# Patient Record
Sex: MALE | Race: WHITE | NOT HISPANIC OR LATINO
[De-identification: names, ages, dates, MRNs, and addresses within clinical notes are randomized per-mention and may not be internally consistent; named-entity substitution may affect disease eponyms.]

---

## 2017-09-13 ENCOUNTER — RESULT REVIEW (OUTPATIENT)
Age: 67
End: 2017-09-13

## 2017-09-13 ENCOUNTER — OUTPATIENT (OUTPATIENT)
Dept: OUTPATIENT SERVICES | Facility: HOSPITAL | Age: 67
LOS: 1 days | Discharge: HOME | End: 2017-09-13

## 2017-09-13 DIAGNOSIS — Z98.89 OTHER SPECIFIED POSTPROCEDURAL STATES: Chronic | ICD-10-CM

## 2017-09-15 DIAGNOSIS — H04.551 ACQUIRED STENOSIS OF RIGHT NASOLACRIMAL DUCT: ICD-10-CM

## 2017-09-15 DIAGNOSIS — H04.411 CHRONIC DACRYOCYSTITIS OF RIGHT LACRIMAL PASSAGE: ICD-10-CM

## 2017-09-20 LAB — SURGICAL PATHOLOGY STUDY: SIGNIFICANT CHANGE UP

## 2017-09-21 DIAGNOSIS — M95.0 ACQUIRED DEFORMITY OF NOSE: ICD-10-CM

## 2017-09-21 DIAGNOSIS — Z85.01 PERSONAL HISTORY OF MALIGNANT NEOPLASM OF ESOPHAGUS: ICD-10-CM

## 2017-09-21 DIAGNOSIS — K21.9 GASTRO-ESOPHAGEAL REFLUX DISEASE WITHOUT ESOPHAGITIS: ICD-10-CM

## 2018-02-14 ENCOUNTER — RESULT REVIEW (OUTPATIENT)
Age: 68
End: 2018-02-14

## 2018-02-14 ENCOUNTER — OUTPATIENT (OUTPATIENT)
Dept: OUTPATIENT SERVICES | Facility: HOSPITAL | Age: 68
LOS: 1 days | Discharge: HOME | End: 2018-02-14

## 2018-02-14 DIAGNOSIS — Z98.89 OTHER SPECIFIED POSTPROCEDURAL STATES: Chronic | ICD-10-CM

## 2018-02-21 LAB — SURGICAL PATHOLOGY STUDY: SIGNIFICANT CHANGE UP

## 2019-04-22 ENCOUNTER — EMERGENCY (EMERGENCY)
Facility: HOSPITAL | Age: 69
LOS: 1 days | Discharge: HOME | End: 2019-04-22
Attending: EMERGENCY MEDICINE | Admitting: EMERGENCY MEDICINE
Payer: MEDICARE

## 2019-04-22 VITALS
HEART RATE: 54 BPM | SYSTOLIC BLOOD PRESSURE: 117 MMHG | TEMPERATURE: 98 F | RESPIRATION RATE: 18 BRPM | OXYGEN SATURATION: 100 % | DIASTOLIC BLOOD PRESSURE: 70 MMHG

## 2019-04-22 VITALS
HEART RATE: 65 BPM | RESPIRATION RATE: 16 BRPM | DIASTOLIC BLOOD PRESSURE: 64 MMHG | TEMPERATURE: 98 F | SYSTOLIC BLOOD PRESSURE: 113 MMHG | OXYGEN SATURATION: 98 %

## 2019-04-22 DIAGNOSIS — M25.552 PAIN IN LEFT HIP: ICD-10-CM

## 2019-04-22 DIAGNOSIS — Z98.89 OTHER SPECIFIED POSTPROCEDURAL STATES: Chronic | ICD-10-CM

## 2019-04-22 DIAGNOSIS — Z79.899 OTHER LONG TERM (CURRENT) DRUG THERAPY: ICD-10-CM

## 2019-04-22 DIAGNOSIS — R10.32 LEFT LOWER QUADRANT PAIN: ICD-10-CM

## 2019-04-22 LAB
ALBUMIN SERPL ELPH-MCNC: 4.3 G/DL — SIGNIFICANT CHANGE UP (ref 3.3–5)
ALP SERPL-CCNC: 43 U/L — SIGNIFICANT CHANGE UP (ref 40–120)
ALT FLD-CCNC: 19 U/L — SIGNIFICANT CHANGE UP (ref 10–45)
ANION GAP SERPL CALC-SCNC: 11 MMOL/L — SIGNIFICANT CHANGE UP (ref 5–17)
APPEARANCE UR: CLEAR — SIGNIFICANT CHANGE UP
APTT BLD: 29 SEC — SIGNIFICANT CHANGE UP (ref 27.5–36.3)
AST SERPL-CCNC: 21 U/L — SIGNIFICANT CHANGE UP (ref 10–40)
BASOPHILS # BLD AUTO: 0.02 K/UL — SIGNIFICANT CHANGE UP (ref 0–0.2)
BASOPHILS NFR BLD AUTO: 0.5 % — SIGNIFICANT CHANGE UP (ref 0–2)
BILIRUB SERPL-MCNC: 1.5 MG/DL — HIGH (ref 0.2–1.2)
BILIRUB UR-MCNC: NEGATIVE — SIGNIFICANT CHANGE UP
BLD GP AB SCN SERPL QL: NEGATIVE — SIGNIFICANT CHANGE UP
BUN SERPL-MCNC: 22 MG/DL — SIGNIFICANT CHANGE UP (ref 7–23)
CALCIUM SERPL-MCNC: 10.3 MG/DL — SIGNIFICANT CHANGE UP (ref 8.4–10.5)
CHLORIDE SERPL-SCNC: 106 MMOL/L — SIGNIFICANT CHANGE UP (ref 96–108)
CO2 SERPL-SCNC: 25 MMOL/L — SIGNIFICANT CHANGE UP (ref 22–31)
COLOR SPEC: YELLOW — SIGNIFICANT CHANGE UP
CREAT SERPL-MCNC: 0.96 MG/DL — SIGNIFICANT CHANGE UP (ref 0.5–1.3)
DIFF PNL FLD: ABNORMAL
EOSINOPHIL # BLD AUTO: 0.12 K/UL — SIGNIFICANT CHANGE UP (ref 0–0.5)
EOSINOPHIL NFR BLD AUTO: 3.1 % — SIGNIFICANT CHANGE UP (ref 0–6)
GLUCOSE SERPL-MCNC: 88 MG/DL — SIGNIFICANT CHANGE UP (ref 70–99)
GLUCOSE UR QL: NEGATIVE — SIGNIFICANT CHANGE UP
HCT VFR BLD CALC: 38.3 % — LOW (ref 39–50)
HGB BLD-MCNC: 12.8 G/DL — LOW (ref 13–17)
IMM GRANULOCYTES NFR BLD AUTO: 0 % — SIGNIFICANT CHANGE UP (ref 0–1.5)
INR BLD: 0.96 — SIGNIFICANT CHANGE UP (ref 0.88–1.16)
KETONES UR-MCNC: NEGATIVE — SIGNIFICANT CHANGE UP
LEUKOCYTE ESTERASE UR-ACNC: NEGATIVE — SIGNIFICANT CHANGE UP
LYMPHOCYTES # BLD AUTO: 0.42 K/UL — LOW (ref 1–3.3)
LYMPHOCYTES # BLD AUTO: 10.7 % — LOW (ref 13–44)
MCHC RBC-ENTMCNC: 32.7 PG — SIGNIFICANT CHANGE UP (ref 27–34)
MCHC RBC-ENTMCNC: 33.4 GM/DL — SIGNIFICANT CHANGE UP (ref 32–36)
MCV RBC AUTO: 97.7 FL — SIGNIFICANT CHANGE UP (ref 80–100)
MONOCYTES # BLD AUTO: 0.35 K/UL — SIGNIFICANT CHANGE UP (ref 0–0.9)
MONOCYTES NFR BLD AUTO: 9 % — SIGNIFICANT CHANGE UP (ref 2–14)
NEUTROPHILS # BLD AUTO: 3 K/UL — SIGNIFICANT CHANGE UP (ref 1.8–7.4)
NEUTROPHILS NFR BLD AUTO: 76.7 % — SIGNIFICANT CHANGE UP (ref 43–77)
NITRITE UR-MCNC: NEGATIVE — SIGNIFICANT CHANGE UP
NRBC # BLD: 0 /100 WBCS — SIGNIFICANT CHANGE UP (ref 0–0)
PH UR: 5 — SIGNIFICANT CHANGE UP (ref 5–8)
PLATELET # BLD AUTO: 180 K/UL — SIGNIFICANT CHANGE UP (ref 150–400)
POTASSIUM SERPL-MCNC: 4.3 MMOL/L — SIGNIFICANT CHANGE UP (ref 3.5–5.3)
POTASSIUM SERPL-SCNC: 4.3 MMOL/L — SIGNIFICANT CHANGE UP (ref 3.5–5.3)
PROT SERPL-MCNC: 7 G/DL — SIGNIFICANT CHANGE UP (ref 6–8.3)
PROT UR-MCNC: NEGATIVE MG/DL — SIGNIFICANT CHANGE UP
PROTHROM AB SERPL-ACNC: 10.8 SEC — SIGNIFICANT CHANGE UP (ref 10–12.9)
RBC # BLD: 3.92 M/UL — LOW (ref 4.2–5.8)
RBC # FLD: 12.1 % — SIGNIFICANT CHANGE UP (ref 10.3–14.5)
RH IG SCN BLD-IMP: NEGATIVE — SIGNIFICANT CHANGE UP
SODIUM SERPL-SCNC: 142 MMOL/L — SIGNIFICANT CHANGE UP (ref 135–145)
SP GR SPEC: <=1.005 — SIGNIFICANT CHANGE UP (ref 1–1.03)
UROBILINOGEN FLD QL: 0.2 E.U./DL — SIGNIFICANT CHANGE UP
WBC # BLD: 3.91 K/UL — SIGNIFICANT CHANGE UP (ref 3.8–10.5)
WBC # FLD AUTO: 3.91 K/UL — SIGNIFICANT CHANGE UP (ref 3.8–10.5)

## 2019-04-22 PROCEDURE — 86901 BLOOD TYPING SEROLOGIC RH(D): CPT

## 2019-04-22 PROCEDURE — 74177 CT ABD & PELVIS W/CONTRAST: CPT

## 2019-04-22 PROCEDURE — 99284 EMERGENCY DEPT VISIT MOD MDM: CPT

## 2019-04-22 PROCEDURE — 85610 PROTHROMBIN TIME: CPT

## 2019-04-22 PROCEDURE — 86900 BLOOD TYPING SEROLOGIC ABO: CPT

## 2019-04-22 PROCEDURE — 86850 RBC ANTIBODY SCREEN: CPT

## 2019-04-22 PROCEDURE — 81001 URINALYSIS AUTO W/SCOPE: CPT

## 2019-04-22 PROCEDURE — 80053 COMPREHEN METABOLIC PANEL: CPT

## 2019-04-22 PROCEDURE — 74177 CT ABD & PELVIS W/CONTRAST: CPT | Mod: 26

## 2019-04-22 PROCEDURE — 36415 COLL VENOUS BLD VENIPUNCTURE: CPT

## 2019-04-22 PROCEDURE — 85730 THROMBOPLASTIN TIME PARTIAL: CPT

## 2019-04-22 PROCEDURE — 99284 EMERGENCY DEPT VISIT MOD MDM: CPT | Mod: 25

## 2019-04-22 PROCEDURE — 85025 COMPLETE CBC W/AUTO DIFF WBC: CPT

## 2019-04-22 RX ORDER — IOHEXOL 300 MG/ML
30 INJECTION, SOLUTION INTRAVENOUS ONCE
Qty: 0 | Refills: 0 | Status: COMPLETED | OUTPATIENT
Start: 2019-04-22 | End: 2019-04-22

## 2019-04-22 RX ADMIN — IOHEXOL 30 MILLILITER(S): 300 INJECTION, SOLUTION INTRAVENOUS at 14:57

## 2019-04-22 NOTE — ED ADULT NURSE NOTE - OBJECTIVE STATEMENT
Patient is a 67yo male reporting left groin pain x10 days. Patient reports he thought he had pulled a muscle but the pain has been increasing. Denies fevers, chills, abdominal pain, n/v/d, urinary symptoms. Ambulating with steady gait.

## 2019-04-22 NOTE — ED PROVIDER NOTE - CARE PROVIDER_API CALL
Mikey Shelby)  Surgery  [address] [place] [address], [address]  [place], [place] [address]  Phone: [phone]  Fax: [phone]  Follow Up Time:

## 2019-04-22 NOTE — ED ADULT TRIAGE NOTE - CHIEF COMPLAINT QUOTE
pt c/o L groin pain for 10 days. referred by Dr. Kovacs to r/o hernia. denies fever/chills, chest pain, sob, swelling or redness.

## 2019-04-22 NOTE — CONSULT NOTE ADULT - SUBJECTIVE AND OBJECTIVE BOX
GENERAL SURGERY CONSULT NOTE    HPI:    Mr. Rowe is a 67 yo M with history of head/neck cancer s/p radiation and chemotherapy (cancer in remission per follow-up in 1/2019) and esophageal cancer s/p esophagectomy (18 years ago) presenting with left groin pain and discomfort. Patient reports that the he first noticed his left groin pain 7 days ago. He has never had anything similar in the past. He states that the achy pain radiates to his left buttock. He denies any exacerbating or alleviating factors. He states that he is very active and exercises including biking for at least 1 hour every day. He reports that he is unable to reproduce the pain and does not believe it is related to his exercising. He denies fevers, chills, nausea, or emesis. His last bowel movement was today and he denies hematochezia or melena.    CT imaging does not show any acute pathology.     PMH/PSx: head/neck cancer s/p radiation and chemotherapy (cancer in remission per follow-up in 1/2019) and esophageal cancer s/p esophagectomy (18 years ago), osteoporosis (on DEXA scan 1 year ago, not on medications at this time)  Allergies: NKDA  Medications: ranitidine, chlorthalidone  FH: father diagnosed with colon cancer at age 80s, mother diagnosed with ovarian cancer at age 40s  SH: social ETOH use, denies smoking or drug use      Vital Signs Last 24 Hrs  T(C): 36.4 (22 Apr 2019 13:48), Max: 36.4 (22 Apr 2019 13:48)  T(F): 97.6 (22 Apr 2019 13:48), Max: 97.6 (22 Apr 2019 13:48)  HR: 54 (22 Apr 2019 13:48) (54 - 54)  BP: 117/70 (22 Apr 2019 13:48) (117/70 - 117/70)  BP(mean): --  RR: 18 (22 Apr 2019 13:48) (18 - 18)  SpO2: 100% (22 Apr 2019 13:48) (100% - 100%)    PHYSICAL EXAM:  General: no acute distress  Cardiovascular: NSR  Pulmonary: nonlabored breathing, no respiratory distress  Abdomen: soft, nondistended, nontender  Groin: b/l small inguinal hernias on Valsalva. Nontender.  Extremities: nonedematous    LABS:                        12.8   3.91  )-----------( 180      ( 22 Apr 2019 14:57 )             38.3     04-22    142  |  106  |  22  ----------------------------<  88  4.3   |  25  |  0.96    Ca    10.3      22 Apr 2019 14:57    TPro  7.0  /  Alb  4.3  /  TBili  1.5<H>  /  DBili  x   /  AST  21  /  ALT  19  /  AlkPhos  43  04-22    PT/INR - ( 22 Apr 2019 14:57 )   PT: 10.8 sec;   INR: 0.96          PTT - ( 22 Apr 2019 14:57 )  PTT:29.0 sec  Urinalysis Basic - ( 22 Apr 2019 19:10 )    Color: Yellow / Appearance: Clear / SG: <=1.005 / pH: x  Gluc: x / Ketone: NEGATIVE  / Bili: Negative / Urobili: 0.2 E.U./dL   Blood: x / Protein: NEGATIVE mg/dL / Nitrite: NEGATIVE   Leuk Esterase: NEGATIVE / RBC: < 5 /HPF / WBC < 5 /HPF   Sq Epi: x / Non Sq Epi: 0-5 /HPF / Bacteria: Present /HPF

## 2019-04-22 NOTE — ED PROVIDER NOTE - NS ED ROS FT
Constitutional: No fever. No chills.  Eyes: No redness. No discharge. No vision change.   ENT: No sore throat. No ear pain.  Cardiovascular: No chest pain. No leg swelling.  Respiratory: No cough. No shortness of breath.  GI: No abdominal pain. No vomiting. No diarrhea.   : +L groin pain  MSK: No joint pain. No back pain.   Skin: No rash. No abrasions.   Neuro: No numbness. No weakness.   Psych: No known mental health issues.

## 2019-04-22 NOTE — ED PROVIDER NOTE - ATTENDING CONTRIBUTION TO CARE
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  Recently increased exercise.  Positional.  Sent for r/o hernia.  Hx of esophageal and tonsillar cancer.  Mild ttp L groin no clear hernia.  CT and labs obtained and noted.  Shared results with patient.  Surgery cleared for discharge.  LIkely msk but will need follow up for incidental results.

## 2019-04-22 NOTE — ED PROVIDER NOTE - PHYSICAL EXAMINATION
VITAL SIGNS: I have reviewed nursing notes and confirm.  CONSTITUTIONAL: Well-developed; well-nourished; in no acute distress.   SKIN:  warm and dry, no acute rash.   HEAD:  normocephalic, atraumatic.  EYES: PERRL, EOM intact; conjunctiva and sclera clear.  ENT: No nasal discharge; airway clear.   NECK: Supple; non tender.  CARD: S1, S2 normal; no murmurs, gallops, or rubs. Regular rate and rhythm.   RESP:  Clear to auscultation b/l, no wheezes, rales or rhonchi.  ABD: Normal bowel sounds; soft; non-distended; non-tender; no guarding/ rebound. No palpable hernia.   EXT: Normal ROM. Nttp of left lateral hip. No clubbing, cyanosis or edema. 2+ pulses to b/l ue/le.  NEURO: Alert, oriented, grossly unremarkable. 5/5 strength in BLE. Sensation equal and intact in BLE.   PSYCH: Cooperative, mood and affect appropriate.

## 2019-04-22 NOTE — CONSULT NOTE ADULT - ASSESSMENT
Mr. Rowe is a 67 yo M presenting with left groin pain radiating to left buttock likely secondary to musculoskeletal origin.    - Patient does not need any surgical intervention given negative inguinal hernia finding on imaging.   - Patient is instructed to follow up in office with Dr. Shelby on Friday 4/26.  - Patient instructed to return to ED if worsening symptoms or any other concerns.  - Plan discussed with attending and chief resident.

## 2019-04-22 NOTE — ED ADULT NURSE NOTE - NSIMPLEMENTINTERV_GEN_ALL_ED
Implemented All Universal Safety Interventions:  [place] to call system. Call bell, personal items and telephone within reach. Instruct patient to call for assistance. Room bathroom lighting operational. Non-slip footwear when patient is off stretcher. Physically safe environment: no spills, clutter or unnecessary equipment. Stretcher in lowest position, wheels locked, appropriate side rails in place.

## 2019-04-22 NOTE — ED PROVIDER NOTE - CLINICAL SUMMARY MEDICAL DECISION MAKING FREE TEXT BOX
ED course unremarkable - afebrile and hemodynamically stable. Pain non-reproducible on exam. Declined pain medication while in ED. CBC, CMP unremarkable. UA without evidence of infection. CTAP without acute findings, no etiology for pt's pain. No evidence of hernia or occult hip fracture. Surgery evaluated pt in ED, discussed results with patient. Pt will follow up with Dr. Kovacs on 4/26. Strict return precautions given.

## 2019-04-22 NOTE — ED PROVIDER NOTE - NSFOLLOWUPINSTRUCTIONS_ED_ALL_ED_FT
Follow up with Dr. Shelby as scheduled on 4/26/19.    Return to the Emergency Department if you develop fever>100.4F, worsening pain, numbness or weakness of leg, or any other concerns.

## 2019-04-22 NOTE — ED PROVIDER NOTE - OBJECTIVE STATEMENT
67yo male with pmhx of esophageal cancer (15 years ago) and tonsillar cancer (now s/p chemo in remission) presents with L groin/hip pain x10 days. Pt reports gradual onset of pain without preceding injury or trauma. Denies fever, chills, abd pain, N/V/D, constipation, urinary symptoms, numbness or weakness of lower extremities. No pain with bearing weight. Pt is a long distance cyclist and went on 6 day bike trip in [place] prior to onset. Sent by Dr. Kovacs for r/o inguinal hernia vs. occult hip fracture.

## 2022-10-10 PROBLEM — C09.9 MALIGNANT NEOPLASM OF TONSIL, UNSPECIFIED: Chronic | Status: ACTIVE | Noted: 2019-04-22

## 2022-11-08 ENCOUNTER — NON-APPOINTMENT (OUTPATIENT)
Age: 72
End: 2022-11-08

## 2022-11-08 ENCOUNTER — APPOINTMENT (OUTPATIENT)
Dept: HEART AND VASCULAR | Facility: CLINIC | Age: 72
End: 2022-11-08

## 2022-11-08 VITALS
BODY MASS INDEX: 21.53 KG/M2 | HEIGHT: 66 IN | HEART RATE: 58 BPM | WEIGHT: 134 LBS | SYSTOLIC BLOOD PRESSURE: 112 MMHG | DIASTOLIC BLOOD PRESSURE: 70 MMHG

## 2022-11-08 DIAGNOSIS — Z87.19 PERSONAL HISTORY OF OTHER DISEASES OF THE DIGESTIVE SYSTEM: ICD-10-CM

## 2022-11-08 DIAGNOSIS — Z85.818 PERSONAL HISTORY OF MALIGNANT NEOPLASM OF OTHER SITES OF LIP, ORAL CAVITY, AND PHARYNX: ICD-10-CM

## 2022-11-08 DIAGNOSIS — Z85.01 PERSONAL HISTORY OF MALIGNANT NEOPLASM OF ESOPHAGUS: ICD-10-CM

## 2022-11-08 DIAGNOSIS — Z78.9 OTHER SPECIFIED HEALTH STATUS: ICD-10-CM

## 2022-11-08 DIAGNOSIS — Z86.19 PERSONAL HISTORY OF OTHER INFECTIOUS AND PARASITIC DISEASES: ICD-10-CM

## 2022-11-08 DIAGNOSIS — I34.0 NONRHEUMATIC MITRAL (VALVE) INSUFFICIENCY: ICD-10-CM

## 2022-11-08 DIAGNOSIS — Z82.49 FAMILY HISTORY OF ISCHEMIC HEART DISEASE AND OTHER DISEASES OF THE CIRCULATORY SYSTEM: ICD-10-CM

## 2022-11-08 DIAGNOSIS — E78.5 HYPERLIPIDEMIA, UNSPECIFIED: ICD-10-CM

## 2022-11-08 DIAGNOSIS — Z80.41 FAMILY HISTORY OF MALIGNANT NEOPLASM OF OVARY: ICD-10-CM

## 2022-11-08 DIAGNOSIS — Z87.891 PERSONAL HISTORY OF NICOTINE DEPENDENCE: ICD-10-CM

## 2022-11-08 PROCEDURE — 93000 ELECTROCARDIOGRAM COMPLETE: CPT

## 2022-11-08 PROCEDURE — 99204 OFFICE O/P NEW MOD 45 MIN: CPT | Mod: 25

## 2022-11-08 PROCEDURE — 36415 COLL VENOUS BLD VENIPUNCTURE: CPT

## 2022-11-08 RX ORDER — CLONAZEPAM 0.5 MG/1
0.5 TABLET ORAL
Qty: 90 | Refills: 0 | Status: ACTIVE | COMMUNITY
Start: 2022-10-27

## 2022-11-08 RX ORDER — LISDEXAMFETAMINE DIMESYLATE 10 MG/1
10 CAPSULE ORAL
Qty: 60 | Refills: 0 | Status: ACTIVE | COMMUNITY
Start: 2022-10-11

## 2022-11-08 RX ORDER — SODIUM FLUORIDE 1.1 G/100G
1.1 GEL, DENTIFRICE ORAL
Qty: 51 | Refills: 0 | Status: ACTIVE | COMMUNITY
Start: 2022-05-06

## 2022-11-08 RX ORDER — FAMOTIDINE 10 MG/1
TABLET, FILM COATED ORAL
Refills: 0 | Status: ACTIVE | COMMUNITY

## 2022-11-09 NOTE — CARDIOLOGY SUMMARY
[de-identified] : 07/31/2018: moderate mitral regurgitation, prolapse of anterior mitral leaflet, normal LVSF \par 09/11/2017: mild mitral regurgitation, normal LVSF

## 2022-11-09 NOTE — HISTORY OF PRESENT ILLNESS
[FreeTextEntry1] : Since his last visit, patient is feeling well overall. He remains active riding his bicycle 3-7 days/week, between  miles per week. He also does strength training with a  2 times/week and roller skates several days, up to 3 hours at a time. No exertional chest pain or shortness of breath. He also denies any orthopnea, PND, palpitations, lightheadedness, syncope or lower extremity edema. \par \par Of note: \par - [place] [facility] [place] \par - He is up-to-date with his colonoscopy \par - PCP is Dr. Bowles \par  few months \par ---------------------------\par Labs (10/2022);\par Lipid profile: TC: 202, T, HDL: 80, LDL: 111

## 2022-11-09 NOTE — ASSESSMENT
[FreeTextEntry1] : Pt. is a 71 year old M with PMHx of HLD, esophageal cancer (s/p esophagectomy), tonsillar cancer and brain mass (s/p chemo and radiation) who presents today for follow-up after several years. \par \par Mitral regurgitation: \par - EKG today: Sinus bradycardia \par - BP well-controlled\par - Last echo (2019) revealed moderate mitral regurgitation with normal LVSF \par - Will repeat echo at follow-up\par \par HLD/ASCVD risk reduction:\par - ASCVD risk elevated at 10.97%\par - Last LDL (10/2022) 111 elevated above goal LDL < 100 \par - Coronary artery calcium (CAC) ordered for further risk stratification. Based on all test results, we will decide on an appropriate preventive treatment regimen for the pt.\par - Encouraged patient to continue healthy exercise and eating habits, focusing on a Mediterranean style of eating and aiming for the recommended 150 minutes per week of moderate physical activity \par \par Pt. to RTC for echocardiogram and stress EKG\par I have supervised  this with the office acp.I have preformed a history,physical exam and my own medical decision making. I have reviewed the note and agree with the findings and have reviewed a plan

## 2022-11-09 NOTE — REVIEW OF SYSTEMS
[Fever] : no fever [Chills] : no chills [SOB] : no shortness of breath [Dyspnea on exertion] : not dyspnea during exertion [Chest Discomfort] : no chest discomfort [Lower Ext Edema] : no extremity edema [Leg Claudication] : no intermittent leg claudication [Palpitations] : no palpitations [Orthopnea] : no orthopnea [PND] : no PND [Syncope] : no syncope [Dizziness] : no dizziness

## 2022-11-09 NOTE — REASON FOR VISIT
[Hyperlipidemia] : hyperlipidemia [FreeTextEntry1] : Pt. is a 71 year old M with PMHx of HLD, esophageal cancer (s/p esophagectomy), tonsillar cancer and brain mass (s/p chemo and radiation) who presents today for follow-up after several years.

## 2022-11-09 NOTE — PHYSICAL EXAM
[Normal Conjunctiva] : normal conjunctiva [Normal Venous Pressure] : normal venous pressure [No Carotid Bruit] : no carotid bruit [Normal S1, S2] : normal S1, S2 [Normal Gait] : normal gait [No Edema] : no edema [Normal] : alert and oriented, normal memory [de-identified] : 1/6 systolic murmur

## 2022-12-29 ENCOUNTER — OUTPATIENT (OUTPATIENT)
Dept: OUTPATIENT SERVICES | Facility: HOSPITAL | Age: 72
LOS: 1 days | End: 2022-12-29
Payer: SELF-PAY

## 2022-12-29 ENCOUNTER — APPOINTMENT (OUTPATIENT)
Dept: CT IMAGING | Facility: HOSPITAL | Age: 72
End: 2022-12-29

## 2022-12-29 DIAGNOSIS — Z98.89 OTHER SPECIFIED POSTPROCEDURAL STATES: Chronic | ICD-10-CM

## 2022-12-29 PROCEDURE — 75571 CT HRT W/O DYE W/CA TEST: CPT | Mod: 26

## 2022-12-29 PROCEDURE — 75571 CT HRT W/O DYE W/CA TEST: CPT

## 2023-01-02 ENCOUNTER — NON-APPOINTMENT (OUTPATIENT)
Age: 73
End: 2023-01-02

## 2023-01-03 ENCOUNTER — NON-APPOINTMENT (OUTPATIENT)
Age: 73
End: 2023-01-03

## 2023-01-04 ENCOUNTER — APPOINTMENT (OUTPATIENT)
Dept: HEART AND VASCULAR | Facility: CLINIC | Age: 73
End: 2023-01-04

## 2023-02-23 ENCOUNTER — APPOINTMENT (OUTPATIENT)
Dept: HEART AND VASCULAR | Facility: CLINIC | Age: 73
End: 2023-02-23
Payer: MEDICARE

## 2023-02-23 VITALS
BODY MASS INDEX: 21.69 KG/M2 | HEIGHT: 66 IN | WEIGHT: 135 LBS | DIASTOLIC BLOOD PRESSURE: 60 MMHG | SYSTOLIC BLOOD PRESSURE: 96 MMHG

## 2023-02-23 PROCEDURE — 93015 CV STRESS TEST SUPVJ I&R: CPT

## 2023-02-23 PROCEDURE — 93306 TTE W/DOPPLER COMPLETE: CPT

## 2023-02-23 PROCEDURE — 99214 OFFICE O/P EST MOD 30 MIN: CPT | Mod: 25

## 2023-02-24 NOTE — HISTORY OF PRESENT ILLNESS
[FreeTextEntry1] : Since his last visit, patient is feeling well overall. He remains active riding his bicycle 3-7 days/week, between  miles per week. He also does strength training with a  2 times/week and roller skates several days, up to 3 hours at a time. No exertional chest pain or shortness of breath. He also denies any orthopnea, PND, palpitations, lightheadedness, syncope or lower extremity edema. \par \par Of note: \par - He follows up with his Oncologist, Dr. Clayton Beyer at [facility] [place] every few months \par - He is up-to-date with his colonoscopy \par - PCP is Dr. Bowles \par  few months \par ---------------------------\par Labs (10/2022);\par Lipid profile: TC: 202, T, HDL: 80, LDL: 111

## 2023-02-24 NOTE — CARDIOLOGY SUMMARY
[de-identified] : 07/31/2018: moderate mitral regurgitation, prolapse of anterior mitral leaflet, normal LVSF \par 09/11/2017: mild mitral regurgitation, normal LVSF

## 2023-02-24 NOTE — ASSESSMENT
[FreeTextEntry1] : Pt. is a 71 year old M with PMHx of HLD, esophageal cancer (s/p esophagectomy), tonsillar cancer and brain mass (s/p chemo and radiation) who presents today for follow-up after several years. \par \par Mitral regurgitation: \par - EKG today: Sinus bradycardia \par - BP well-controlled\par - echo (2019) revealed moderate mitral regurgitation with normal LVSF \par - echocardiogram mild mr\par \par HLD/ASCVD risk reduction:\par - ASCVD risk elevated at 10.97%\par - Last LDL (10/2022) 111 elevated above goal LDL < 100 \par - Coronary artery calcium (CAC)  of 0 but pt had significant st depressions onett sent for cta\par - Encouraged patient to continue healthy exercise and eating habits, focusing on a Mediterranean style of eating and aiming for the recommended 150 minutes per week of moderate physical activity \par \par \par

## 2023-02-24 NOTE — PHYSICAL EXAM
[Normal Conjunctiva] : normal conjunctiva [Normal Venous Pressure] : normal venous pressure [No Carotid Bruit] : no carotid bruit [Normal S1, S2] : normal S1, S2 [Normal Gait] : normal gait [No Edema] : no edema [Normal] : alert and oriented, normal memory [de-identified] : 1/6 systolic murmur

## 2023-02-28 ENCOUNTER — APPOINTMENT (OUTPATIENT)
Dept: CT IMAGING | Facility: HOSPITAL | Age: 73
End: 2023-02-28

## 2023-02-28 ENCOUNTER — OUTPATIENT (OUTPATIENT)
Dept: OUTPATIENT SERVICES | Facility: HOSPITAL | Age: 73
LOS: 1 days | End: 2023-02-28
Payer: MEDICARE

## 2023-02-28 DIAGNOSIS — Z98.89 OTHER SPECIFIED POSTPROCEDURAL STATES: Chronic | ICD-10-CM

## 2023-02-28 LAB — POCT ISTAT CREATININE: 1.1 MG/DL — SIGNIFICANT CHANGE UP (ref 0.5–1.3)

## 2023-02-28 PROCEDURE — 75574 CT ANGIO HRT W/3D IMAGE: CPT | Mod: 26,MH

## 2023-02-28 PROCEDURE — 75574 CT ANGIO HRT W/3D IMAGE: CPT

## 2023-02-28 PROCEDURE — 82565 ASSAY OF CREATININE: CPT

## 2024-11-10 ENCOUNTER — EMERGENCY (EMERGENCY)
Facility: HOSPITAL | Age: 74
LOS: 1 days | Discharge: HOME | End: 2024-11-10
Attending: EMERGENCY MEDICINE | Admitting: EMERGENCY MEDICINE
Payer: MEDICARE

## 2024-11-10 VITALS
RESPIRATION RATE: 18 BRPM | WEIGHT: 138.01 LBS | DIASTOLIC BLOOD PRESSURE: 79 MMHG | HEART RATE: 78 BPM | SYSTOLIC BLOOD PRESSURE: 122 MMHG | TEMPERATURE: 99 F | OXYGEN SATURATION: 97 %

## 2024-11-10 VITALS
RESPIRATION RATE: 18 BRPM | DIASTOLIC BLOOD PRESSURE: 71 MMHG | OXYGEN SATURATION: 97 % | TEMPERATURE: 98 F | HEART RATE: 62 BPM | SYSTOLIC BLOOD PRESSURE: 114 MMHG

## 2024-11-10 DIAGNOSIS — Z98.89 OTHER SPECIFIED POSTPROCEDURAL STATES: Chronic | ICD-10-CM

## 2024-11-10 DIAGNOSIS — Z85.01 PERSONAL HISTORY OF MALIGNANT NEOPLASM OF ESOPHAGUS: ICD-10-CM

## 2024-11-10 LAB
ALBUMIN SERPL ELPH-MCNC: 4.2 G/DL — SIGNIFICANT CHANGE UP (ref 3.3–5)
ALP SERPL-CCNC: 58 U/L — SIGNIFICANT CHANGE UP (ref 40–120)
ALT FLD-CCNC: 13 U/L — SIGNIFICANT CHANGE UP (ref 10–45)
ANION GAP SERPL CALC-SCNC: 11 MMOL/L — SIGNIFICANT CHANGE UP (ref 5–17)
AST SERPL-CCNC: 16 U/L — SIGNIFICANT CHANGE UP (ref 10–40)
BASOPHILS # BLD AUTO: 0.03 K/UL — SIGNIFICANT CHANGE UP (ref 0–0.2)
BASOPHILS NFR BLD AUTO: 0.4 % — SIGNIFICANT CHANGE UP (ref 0–2)
BILIRUB SERPL-MCNC: 1.5 MG/DL — HIGH (ref 0.2–1.2)
BUN SERPL-MCNC: 17 MG/DL — SIGNIFICANT CHANGE UP (ref 7–23)
CALCIUM SERPL-MCNC: 9.3 MG/DL — SIGNIFICANT CHANGE UP (ref 8.4–10.5)
CHLORIDE SERPL-SCNC: 103 MMOL/L — SIGNIFICANT CHANGE UP (ref 96–108)
CO2 SERPL-SCNC: 24 MMOL/L — SIGNIFICANT CHANGE UP (ref 22–31)
CREAT SERPL-MCNC: 1 MG/DL — SIGNIFICANT CHANGE UP (ref 0.5–1.3)
EGFR: 80 ML/MIN/1.73M2 — SIGNIFICANT CHANGE UP
EOSINOPHIL # BLD AUTO: 0.15 K/UL — SIGNIFICANT CHANGE UP (ref 0–0.5)
EOSINOPHIL NFR BLD AUTO: 1.9 % — SIGNIFICANT CHANGE UP (ref 0–6)
GLUCOSE SERPL-MCNC: 109 MG/DL — HIGH (ref 70–99)
HCT VFR BLD CALC: 39 % — SIGNIFICANT CHANGE UP (ref 39–50)
HGB BLD-MCNC: 13.3 G/DL — SIGNIFICANT CHANGE UP (ref 13–17)
IMM GRANULOCYTES NFR BLD AUTO: 0.3 % — SIGNIFICANT CHANGE UP (ref 0–0.9)
LIDOCAIN IGE QN: 52 U/L — SIGNIFICANT CHANGE UP (ref 7–60)
LYMPHOCYTES # BLD AUTO: 0.61 K/UL — LOW (ref 1–3.3)
LYMPHOCYTES # BLD AUTO: 7.8 % — LOW (ref 13–44)
MCHC RBC-ENTMCNC: 31.1 PG — SIGNIFICANT CHANGE UP (ref 27–34)
MCHC RBC-ENTMCNC: 34.1 G/DL — SIGNIFICANT CHANGE UP (ref 32–36)
MCV RBC AUTO: 91.1 FL — SIGNIFICANT CHANGE UP (ref 80–100)
MONOCYTES # BLD AUTO: 0.68 K/UL — SIGNIFICANT CHANGE UP (ref 0–0.9)
MONOCYTES NFR BLD AUTO: 8.7 % — SIGNIFICANT CHANGE UP (ref 2–14)
NEUTROPHILS # BLD AUTO: 6.35 K/UL — SIGNIFICANT CHANGE UP (ref 1.8–7.4)
NEUTROPHILS NFR BLD AUTO: 80.9 % — HIGH (ref 43–77)
NRBC # BLD: 0 /100 WBCS — SIGNIFICANT CHANGE UP (ref 0–0)
PLATELET # BLD AUTO: 216 K/UL — SIGNIFICANT CHANGE UP (ref 150–400)
POTASSIUM SERPL-MCNC: 4.3 MMOL/L — SIGNIFICANT CHANGE UP (ref 3.5–5.3)
POTASSIUM SERPL-SCNC: 4.3 MMOL/L — SIGNIFICANT CHANGE UP (ref 3.5–5.3)
PROT SERPL-MCNC: 6.9 G/DL — SIGNIFICANT CHANGE UP (ref 6–8.3)
RBC # BLD: 4.28 M/UL — SIGNIFICANT CHANGE UP (ref 4.2–5.8)
RBC # FLD: 12.3 % — SIGNIFICANT CHANGE UP (ref 10.3–14.5)
SODIUM SERPL-SCNC: 138 MMOL/L — SIGNIFICANT CHANGE UP (ref 135–145)
WBC # BLD: 7.84 K/UL — SIGNIFICANT CHANGE UP (ref 3.8–10.5)
WBC # FLD AUTO: 7.84 K/UL — SIGNIFICANT CHANGE UP (ref 3.8–10.5)

## 2024-11-10 PROCEDURE — 74177 CT ABD & PELVIS W/CONTRAST: CPT | Mod: 26,MC

## 2024-11-10 PROCEDURE — 99285 EMERGENCY DEPT VISIT HI MDM: CPT

## 2024-11-10 RX ORDER — ACETAMINOPHEN 500 MG
1000 TABLET ORAL ONCE
Refills: 0 | Status: COMPLETED | OUTPATIENT
Start: 2024-11-10 | End: 2024-11-10

## 2024-11-10 RX ORDER — AMOXICILLIN AND CLAVULANATE POTASSIUM 600; 42.9 MG/5ML; MG/5ML
1 POWDER, FOR SUSPENSION ORAL ONCE
Refills: 0 | Status: COMPLETED | OUTPATIENT
Start: 2024-11-10 | End: 2024-11-10

## 2024-11-10 RX ORDER — AMOXICILLIN AND CLAVULANATE POTASSIUM 600; 42.9 MG/5ML; MG/5ML
875 POWDER, FOR SUSPENSION ORAL
Qty: 20 | Refills: 0
Start: 2024-11-10 | End: 2024-11-19

## 2024-11-10 RX ORDER — IOHEXOL 300 MG/ML
30 INJECTION, SOLUTION INTRAVENOUS ONCE
Refills: 0 | Status: COMPLETED | OUTPATIENT
Start: 2024-11-10 | End: 2024-11-10

## 2024-11-10 RX ADMIN — AMOXICILLIN AND CLAVULANATE POTASSIUM 1 TABLET(S): 600; 42.9 POWDER, FOR SUSPENSION ORAL at 22:54

## 2024-11-10 RX ADMIN — Medication 400 MILLIGRAM(S): at 22:21

## 2024-11-10 RX ADMIN — IOHEXOL 30 MILLILITER(S): 300 INJECTION, SOLUTION INTRAVENOUS at 16:57

## 2024-11-10 NOTE — ED PROVIDER NOTE - PATIENT PORTAL LINK FT
You can access the FollowMyHealth Patient Portal offered by [facility] by registering at the following website: [email or web address]. By joining [facility]’s FollowMyHealth portal, you will also be able to view your health information using other applications (apps) compatible with our system.

## 2024-11-10 NOTE — ED PROVIDER NOTE - PHYSICAL EXAMINATION
General:  Well appearing, no distress  HEENT:  No conjunctival injection, neck supple, no congestion   Chest:  Non-tender, no crepitance  Lungs:  Clear to auscultation bilaterally   Heart:  s1s2 normal, no murmur  Abdomen:  soft, mild distention, mod tenderness left suprapubic region without guarding or rebound   :  Deferred  Rectal:  Deferred  Extremities: No edema, normal perfusion, no joint swelling or tenderness  Neuro:  Alert, conversant, motor/sensory grossly intact

## 2024-11-10 NOTE — ED PROVIDER NOTE - NSFOLLOWUPINSTRUCTIONS_ED_ALL_ED_FT
CALL YOUR GASTROENTEROLOGIST TOMORROW TO DISCUSS YOUR ER VISIT AND CAT SCAN FINDINGS.    YOU WILL BE ON AUGMENTIN 875 MG TWICE A DAY FOR 10 DAYS.     RETURN TO THE EMERGENCY ROOM IMMEDIATELY FOR:  FEVER  VOMITING  BLOOD IN THE STOOL  YOU CANNOT HAVE A BOWEL MOVEMENT   INCREASING OR SEVERE PAIN  ANY NEW, WORSENING OR CONCERNING SYMPTOMS     Diverticulitis    Diverticulitis happens when poop (stool) and bacteria get trapped in small pouches in the colon called diverticula. These pouches may form if you have a condition called diverticulosis. When the poop and bacteria get trapped, it can cause an infection and inflammation.     In some cases, the diverticula may burst (rupture). This can cause infected poop to go into other parts of your abdomen which can cause a severe infection.     What are the causes?  This condition is caused by poop getting trapped in the diverticula. This allows bacteria to grow. It can lead to inflammation and infection.    What increases the risk?  You are more likely to get this condition if you have diverticulosis. You are also more at risk if:  You are overweight or obese.  You do not get enough exercise.  You drink alcohol.  You smoke.  You eat a lot of red meat, such as beef, pork, or lamb.  You do not get enough fiber. Foods high in fiber include fruits, vegetables, beans, nuts, and whole grains.  You are over 40 years of age.  What are the signs or symptoms?  Symptoms of this condition may include:  Pain and tenderness in the abdomen. This pain is often felt on the left side but may occur in other spots.  Fever and chills.  Nausea and vomiting.  Cramping.  Bloating.  Changes in how often you poop.  Blood in your poop.    How is this diagnosed?    This condition is diagnosed based on your medical history and a physical exam. You may also have tests done to make sure there is nothing else causing your condition. These tests may include:  Blood tests.  Tests done on your pee (urine).  A CT scan of the abdomen.  You may need to have a colonoscopy. This is an exam to look at your whole large intestine. During the exam, a tube is put into the opening of your butt (anus) and then moved into your rectum, colon, and other parts of the large intestine.    This exam is done to look at the diverticula. It can also see if there is something else that may be causing your symptoms.    How is this treated?  Most cases are mild and can be treated at home. You may be told to:  Take over-the-counter pain medicine.  Only eat and drink clear liquids.  Take antibiotics.  Rest.    Medicines    Take over-the-counter and prescription medicines only as told by your health care provider.   If you were prescribed antibiotics, take them as told by your provider    Eating and drinking      DISCHARGE INSTRUCTIONS:    Foods that may be recommended while you have diverticulitis:    A clear liquid diet may be recommended for 2 to 3 days. A clear liquid diet includes clear liquids, and foods that are liquid at room temperature. Examples include the following:  Water and clear juices (such as apple, cranberry, or grape), strained citrus juices or fruit punch    Coffee or tea (without cream or milk)    Clear sports drinks or soft drinks, such as ginger ale, lemon-lime soda, or club soda (no cola or root beer)    Clear broth, bouillon, or consommé    Plain popsicles (no popsicles with pureed fruit or fiber)    Flavored gelatin without fruit    Low-fiber foods may be recommended until your symptoms improve. Examples include the following:      Eggs, poultry (such as chicken and turkey), fish, and tender, ground, well-cooked beef    Tofu and smooth nut butters, such as peanut butter    Broth and strained soups made of low-fiber foods

## 2024-11-10 NOTE — ED ADULT NURSE NOTE - OBJECTIVE STATEMENT
74 y/o M c/o lower abdominal pain x2-3 week, endorses increased pain this AM, chills. Pt denies fever, chest pain, SOB, N/V/D, numbness, tingling, hematuria, dysuria, blood in stool. PT A&Ox4, respirations even and unlabored, skin color WDL warm and dry, pt is ambulatory with a steady gait. No acute distress observed.

## 2024-11-10 NOTE — ED PROVIDER NOTE - CLINICAL SUMMARY MEDICAL DECISION MAKING FREE TEXT BOX
72 yo M PMH noted above with lower abd pain, chills.  Concern for diverticulitis.  Will assess further with labs, ct. 74 yo M PMH noted above with lower abd pain, chills.  Concern for diverticulitis.  Will assess further with labs, ct.    Patient remained comfortable during visit requiring only tylenol for pain.  Tolerated po.  We discussed his results in detail as well as need for close follow up.  Treatment plan reviewed.  He has excellent f/u with his GI at [facility].  Advised of symptoms that should prompt immediate return.  Questions answered.  Patient and his wife verbalized understanding of the discussion and plan.

## 2024-11-10 NOTE — ED PROVIDER NOTE - OBJECTIVE STATEMENT
72 yo M PMH Esophageal cancer (2008 gastric pull through), head and neck cancer presenting for worsening lower abdominal discomfort  Symptoms have been present 2-3 weeks  Today, he felt like he had a fever and had chills requiring the use of 3 blankets.  That has improved.  This morning he also had an increase in pain to the left hypogastric region.  The severity has lessened, but the pain is still present.  He describes it as a constant feeling like he needs to move his bowels but cannot.    He has been to his GI (at [facility]).  CT a few weeks ago negative.  He is due for an MRI Nov 20th.  Colonoscopy in June showed diverticulosis  Patient has been increasingly constipated, which is unusual for him.

## 2024-11-10 NOTE — ED ADULT NURSE NOTE - NSFALLUNIVINTERV_ED_ALL_ED
Bed/Stretcher in lowest position, wheels locked, appropriate side rails in place/Call bell, personal items and telephone in reach/Instruct patient to call for assistance before getting out of bed/chair/stretcher/Non-slip footwear applied when patient is off stretcher/[place] to call system/Physically safe environment - no spills, clutter or unnecessary equipment/Purposeful proactive rounding/Room/bathroom lighting operational, light cord in reach

## 2024-11-11 ENCOUNTER — TRANSCRIPTION ENCOUNTER (OUTPATIENT)
Age: 74
End: 2024-11-11

## 2024-11-13 DIAGNOSIS — K57.92 DIVERTICULITIS OF INTESTINE, PART UNSPECIFIED, WITHOUT PERFORATION OR ABSCESS WITHOUT BLEEDING: ICD-10-CM

## 2024-11-13 DIAGNOSIS — R10.30 LOWER ABDOMINAL PAIN, UNSPECIFIED: ICD-10-CM

## 2024-11-20 PROCEDURE — 83690 ASSAY OF LIPASE: CPT

## 2024-11-20 PROCEDURE — 80053 COMPREHEN METABOLIC PANEL: CPT

## 2024-11-20 PROCEDURE — 96374 THER/PROPH/DIAG INJ IV PUSH: CPT | Mod: XU

## 2024-11-20 PROCEDURE — 85025 COMPLETE CBC W/AUTO DIFF WBC: CPT

## 2024-11-20 PROCEDURE — 74177 CT ABD & PELVIS W/CONTRAST: CPT | Mod: MC

## 2024-11-20 PROCEDURE — 36415 COLL VENOUS BLD VENIPUNCTURE: CPT

## 2024-11-20 PROCEDURE — 99284 EMERGENCY DEPT VISIT MOD MDM: CPT | Mod: 25

## 2024-11-20 PROCEDURE — 82962 GLUCOSE BLOOD TEST: CPT
